# Patient Record
Sex: MALE | Race: WHITE | NOT HISPANIC OR LATINO | Employment: OTHER | URBAN - METROPOLITAN AREA
[De-identification: names, ages, dates, MRNs, and addresses within clinical notes are randomized per-mention and may not be internally consistent; named-entity substitution may affect disease eponyms.]

---

## 2022-03-29 ENCOUNTER — LAB (OUTPATIENT)
Dept: LAB | Facility: HOSPITAL | Age: 52
End: 2022-03-29

## 2022-03-29 ENCOUNTER — PATIENT ROUNDING (BHMG ONLY) (OUTPATIENT)
Dept: ENDOCRINOLOGY | Facility: CLINIC | Age: 52
End: 2022-03-29

## 2022-03-29 ENCOUNTER — OFFICE VISIT (OUTPATIENT)
Dept: ENDOCRINOLOGY | Facility: CLINIC | Age: 52
End: 2022-03-29

## 2022-03-29 VITALS
HEART RATE: 68 BPM | OXYGEN SATURATION: 97 % | HEIGHT: 76 IN | WEIGHT: 287 LBS | DIASTOLIC BLOOD PRESSURE: 82 MMHG | BODY MASS INDEX: 34.95 KG/M2 | SYSTOLIC BLOOD PRESSURE: 128 MMHG

## 2022-03-29 DIAGNOSIS — R79.89 HIGH SERUM PARATHYROID HORMONE (PTH): Primary | ICD-10-CM

## 2022-03-29 DIAGNOSIS — R79.89 HIGH SERUM PARATHYROID HORMONE (PTH): ICD-10-CM

## 2022-03-29 LAB
25(OH)D3 SERPL-MCNC: 29 NG/ML (ref 30–100)
ALBUMIN SERPL-MCNC: 4.9 G/DL (ref 3.5–5.2)
ALP SERPL-CCNC: 118 U/L (ref 39–117)
ANION GAP SERPL CALCULATED.3IONS-SCNC: 8 MMOL/L (ref 5–15)
BUN SERPL-MCNC: 12 MG/DL (ref 6–20)
BUN/CREAT SERPL: 10.4 (ref 7–25)
CALCIUM SPEC-SCNC: 9.5 MG/DL (ref 8.6–10.5)
CHLORIDE SERPL-SCNC: 103 MMOL/L (ref 98–107)
CO2 SERPL-SCNC: 27 MMOL/L (ref 22–29)
CREAT SERPL-MCNC: 1.15 MG/DL (ref 0.76–1.27)
EGFRCR SERPLBLD CKD-EPI 2021: 76.6 ML/MIN/1.73
GLUCOSE SERPL-MCNC: 97 MG/DL (ref 65–99)
PHOSPHATE SERPL-MCNC: 2.1 MG/DL (ref 2.5–4.5)
POTASSIUM SERPL-SCNC: 4.5 MMOL/L (ref 3.5–5.2)
PTH-INTACT SERPL-MCNC: 65.2 PG/ML (ref 15–65)
SODIUM SERPL-SCNC: 138 MMOL/L (ref 136–145)

## 2022-03-29 PROCEDURE — 84075 ASSAY ALKALINE PHOSPHATASE: CPT

## 2022-03-29 PROCEDURE — 36415 COLL VENOUS BLD VENIPUNCTURE: CPT

## 2022-03-29 PROCEDURE — 80069 RENAL FUNCTION PANEL: CPT

## 2022-03-29 PROCEDURE — 99203 OFFICE O/P NEW LOW 30 MIN: CPT | Performed by: INTERNAL MEDICINE

## 2022-03-29 PROCEDURE — 82652 VIT D 1 25-DIHYDROXY: CPT

## 2022-03-29 PROCEDURE — 83970 ASSAY OF PARATHORMONE: CPT

## 2022-03-29 PROCEDURE — 82306 VITAMIN D 25 HYDROXY: CPT

## 2022-03-29 RX ORDER — LORATADINE 10 MG/1
10 TABLET ORAL DAILY
COMMUNITY

## 2022-03-29 RX ORDER — FLUTICASONE PROPIONATE 50 MCG
2 SPRAY, SUSPENSION (ML) NASAL DAILY
COMMUNITY

## 2022-03-29 NOTE — ASSESSMENT & PLAN NOTE
He had elevated serum PTH but normal calcium and vit D levels.  No renal insufficiency.  I'm not sure of the etiology of the elevated PTH.  He could have mild primary hyperparathyroidism.  I would like to check further labs today.

## 2022-03-29 NOTE — PROGRESS NOTES
A [employer] message has been sent to the patient for patient rounding with [facility]

## 2022-03-29 NOTE — PROGRESS NOTES
"Subjective   Heriberto Jennings is a 51 y.o. male who presents to [facility] care for Abnormal Lab (Increased PTH Level)    Chief complaint: elevated PTH      History of Present Illness     He had labs done last year that showed elevated alk phos.  Fractionated levels of alk phos were normal.  He had CMP done 6/2021 that showed calcium of 9.5.  Cr was normal.  Alk phos was mildly elevated.  Ionized calcium was normal.  Vit D was normal at 57.3.  The PTH was 110.7 (ULN 63.5).  The PTH was repeated and was 73.8.  He had labs done again 10/2021.  The ionized calcium was normal again.  The 25 OH vit D was 35.4.  The PTH was 87.9.  He denies any h/o hypercalcemia.  He denies any kidney stones.  He had ankle and wrist fractures in 1980s.  He denies any recent bone fractures. He hasn't had DEXA done.  He denies any biotin use.  He denies any h/o thyroid disease.      The following portions of the patient's history were reviewed and updated as appropriate: allergies, current medications, past family history, past medical history, past social history, past surgical history and problem list.    Review of Systems   Constitutional: Negative.    HENT: Positive for congestion and rhinorrhea.    Eyes: Negative.    Respiratory: Negative.    Cardiovascular: Negative.    Gastrointestinal: Negative.    Endocrine: Negative.    Genitourinary: Negative.    Musculoskeletal: Positive for back pain and joint swelling.   Skin: Negative.    Allergic/Immunologic: Positive for environmental allergies.   Neurological: Negative.    Hematological: Negative.    Psychiatric/Behavioral: Negative.        Objective   Visit Vitals  /82 (BP Location: Left arm, Patient Position: Sitting, Cuff Size: Adult)   Pulse 68   Ht 193 cm (76\")   Wt 130 kg (287 lb)   SpO2 97%   BMI 34.93 kg/m²      Physical Exam  Constitutional:       Appearance: Normal appearance.   HENT:      Head: Normocephalic and atraumatic.   Eyes:      Extraocular Movements: Extraocular " movements intact.      Conjunctiva/sclera: Conjunctivae normal.      Pupils: Pupils are equal, round, and reactive to light.   Cardiovascular:      Rate and Rhythm: Normal rate and regular rhythm.      Pulses: Normal pulses.      Heart sounds: Normal heart sounds.   Pulmonary:      Effort: Pulmonary effort is normal.      Breath sounds: Normal breath sounds.   Abdominal:      General: Bowel sounds are normal.      Palpations: Abdomen is soft.   Musculoskeletal:         General: Normal range of motion.      Cervical back: Normal range of motion and neck supple.   Skin:     General: Skin is warm and dry.   Neurological:      General: No focal deficit present.      Mental Status: He is alert.   Psychiatric:         Mood and Affect: Mood normal.         Behavior: Behavior normal.         Thought Content: Thought content normal.         Judgment: Judgment normal.         Assessment/Plan     Diagnoses and all orders for this visit:    1. High serum parathyroid hormone (PTH) (Primary)  Assessment & Plan:  He had elevated serum PTH but normal calcium and vit D levels.  No renal insufficiency.  I'm not sure of the etiology of the elevated PTH.  He could have mild primary hyperparathyroidism.  I would like to check further labs today.      Orders:  -     Renal Function Panel; Future  -     Vitamin D 25 Hydroxy; Future  -     Vitamin D 1,25 Dihydroxy; Future  -     PTH, Intact; Future  -     Alkaline Phosphatase; Future               Return in about 6 months (around 9/29/2022) for Recheck with CMP, PTH, vit D.    Han Reeves MD   03/29/2022

## 2022-03-31 LAB — 1,25(OH)2D SERPL-MCNC: 48.6 PG/ML (ref 19.9–79.3)

## 2022-09-29 ENCOUNTER — LAB (OUTPATIENT)
Dept: LAB | Facility: HOSPITAL | Age: 52
End: 2022-09-29

## 2022-09-29 ENCOUNTER — OFFICE VISIT (OUTPATIENT)
Dept: ENDOCRINOLOGY | Facility: CLINIC | Age: 52
End: 2022-09-29

## 2022-09-29 VITALS
WEIGHT: 291 LBS | HEART RATE: 67 BPM | SYSTOLIC BLOOD PRESSURE: 126 MMHG | HEIGHT: 76 IN | BODY MASS INDEX: 35.44 KG/M2 | DIASTOLIC BLOOD PRESSURE: 80 MMHG | OXYGEN SATURATION: 96 %

## 2022-09-29 DIAGNOSIS — R74.8 ALKALINE PHOSPHATASE ELEVATION: ICD-10-CM

## 2022-09-29 DIAGNOSIS — S32.020D COMPRESSION FRACTURE OF L2 VERTEBRA WITH ROUTINE HEALING: ICD-10-CM

## 2022-09-29 DIAGNOSIS — R79.89 HIGH SERUM PARATHYROID HORMONE (PTH): Primary | ICD-10-CM

## 2022-09-29 DIAGNOSIS — E55.9 VITAMIN D DEFICIENCY: ICD-10-CM

## 2022-09-29 DIAGNOSIS — R79.89 HIGH SERUM PARATHYROID HORMONE (PTH): ICD-10-CM

## 2022-09-29 LAB
25(OH)D3 SERPL-MCNC: 38.1 NG/ML (ref 30–100)
ALBUMIN SERPL-MCNC: 4.4 G/DL (ref 3.5–5.2)
ANION GAP SERPL CALCULATED.3IONS-SCNC: 7.6 MMOL/L (ref 5–15)
BUN SERPL-MCNC: 11 MG/DL (ref 6–20)
BUN/CREAT SERPL: 10.9 (ref 7–25)
CALCIUM SPEC-SCNC: 8.9 MG/DL (ref 8.6–10.5)
CHLORIDE SERPL-SCNC: 102 MMOL/L (ref 98–107)
CO2 SERPL-SCNC: 29.4 MMOL/L (ref 22–29)
CREAT SERPL-MCNC: 1.01 MG/DL (ref 0.76–1.27)
EGFRCR SERPLBLD CKD-EPI 2021: 88.9 ML/MIN/1.73
GLUCOSE SERPL-MCNC: 99 MG/DL (ref 65–99)
PHOSPHATE SERPL-MCNC: 2.3 MG/DL (ref 2.5–4.5)
POTASSIUM SERPL-SCNC: 4.3 MMOL/L (ref 3.5–5.2)
PTH-INTACT SERPL-MCNC: 83.6 PG/ML (ref 15–65)
SODIUM SERPL-SCNC: 139 MMOL/L (ref 136–145)

## 2022-09-29 PROCEDURE — 83970 ASSAY OF PARATHORMONE: CPT

## 2022-09-29 PROCEDURE — 99214 OFFICE O/P EST MOD 30 MIN: CPT | Performed by: INTERNAL MEDICINE

## 2022-09-29 PROCEDURE — 80069 RENAL FUNCTION PANEL: CPT

## 2022-09-29 PROCEDURE — 82306 VITAMIN D 25 HYDROXY: CPT

## 2022-09-29 PROCEDURE — 36415 COLL VENOUS BLD VENIPUNCTURE: CPT

## 2022-09-29 NOTE — PROGRESS NOTES
"Subjective   Heriberto Jennings is a 52 y.o. male who presents for follow up of Abnormal Lab (PTH)    Chief complaint: high PTH      History of Present Illness     At the last visit the PTH had improved but was slightly high at 65.  The alk phos was slightly elevated.  The phos was mildly low.  Serum calcium was normal.  The vit D was mildly low.  We suggested vit D supplement of 2000 IU daily.  He denies any kidney stones.  He fell about 3 weeks ago.  He went to ER due to hematoma in buttocks.  They did X-ray that showed old L2 compression fracture.  He had labs done that showed calcium of 9.2.  The alk phos was 154 ().  Note was made of small lung nodule.  Further w/u is planned for this.      The following portions of the patient's history were reviewed and updated as appropriate: allergies, current medications, past family history, past medical history, past social history, past surgical history and problem list.    Review of Systems   Constitutional: Negative.    Cardiovascular: Negative.    Gastrointestinal: Negative.    Endocrine: Negative.        Objective   Visit Vitals  /80 (BP Location: Left arm, Patient Position: Sitting, Cuff Size: Adult)   Pulse 67   Ht 193 cm (76\")   Wt 132 kg (291 lb)   SpO2 96%   BMI 35.42 kg/m²      Physical Exam  Constitutional:       Appearance: Normal appearance.   Neurological:      Mental Status: He is alert.         Assessment & Plan     Diagnoses and all orders for this visit:    1. High serum parathyroid hormone (PTH) (Primary)  Assessment & Plan:  Check PTH and calcium today.      Orders:  -     Renal Function Panel; Future  -     PTH, Intact; Future    2. Vitamin D deficiency  Assessment & Plan:  Continue vit D supplement.  Check vit D level today.    Orders:  -     Vitamin D 25 Hydroxy; Future    3. Alkaline phosphatase elevation  Assessment & Plan:  Check alk phos level today.  Will send note about results.    Orders:  -     Cancel: Alkaline Phosphatase    4. " Compression fracture of L2 vertebra with routine healing  Assessment & Plan:  Plan for DEXA scan.      Orders:  -     DEXA Bone Density Axial; Future               Return in about 6 months (around 3/29/2023) for Recheck with renal function panel, PTH, alk phos and vit D..    Han Reeves MD   09/29/2022

## 2022-11-29 ENCOUNTER — HOSPITAL ENCOUNTER (OUTPATIENT)
Dept: BONE DENSITY | Facility: HOSPITAL | Age: 52
Discharge: HOME | End: 2022-11-29
Admitting: INTERNAL MEDICINE

## 2022-11-29 DIAGNOSIS — S32.020D COMPRESSION FRACTURE OF L2 VERTEBRA WITH ROUTINE HEALING: ICD-10-CM

## 2022-11-29 PROCEDURE — 77080 DXA BONE DENSITY AXIAL: CPT

## 2023-03-29 ENCOUNTER — OFFICE VISIT (OUTPATIENT)
Dept: ENDOCRINOLOGY | Facility: CLINIC | Age: 53
End: 2023-03-29
Payer: COMMERCIAL

## 2023-03-29 VITALS
BODY MASS INDEX: 35.92 KG/M2 | SYSTOLIC BLOOD PRESSURE: 122 MMHG | HEART RATE: 58 BPM | HEIGHT: 76 IN | OXYGEN SATURATION: 97 % | DIASTOLIC BLOOD PRESSURE: 80 MMHG | WEIGHT: 295 LBS

## 2023-03-29 DIAGNOSIS — E55.9 VITAMIN D DEFICIENCY: ICD-10-CM

## 2023-03-29 DIAGNOSIS — R79.89 HIGH SERUM PARATHYROID HORMONE (PTH): Primary | ICD-10-CM

## 2023-03-29 DIAGNOSIS — R74.8 ALKALINE PHOSPHATASE ELEVATION: ICD-10-CM

## 2023-03-29 LAB
25(OH)D3 SERPL-MCNC: 25.2 NG/ML (ref 30–100)
ALBUMIN SERPL-MCNC: 4.5 G/DL (ref 3.5–5.2)
ALBUMIN/GLOB SERPL: 1.8 G/DL
ALP SERPL-CCNC: 122 U/L (ref 39–117)
ALT SERPL W P-5'-P-CCNC: 20 U/L (ref 1–41)
ANION GAP SERPL CALCULATED.3IONS-SCNC: 8.3 MMOL/L (ref 5–15)
AST SERPL-CCNC: 17 U/L (ref 1–40)
BILIRUB SERPL-MCNC: 0.5 MG/DL (ref 0–1.2)
BUN SERPL-MCNC: 14 MG/DL (ref 6–20)
BUN/CREAT SERPL: 14.9 (ref 7–25)
CALCIUM SPEC-SCNC: 9.5 MG/DL (ref 8.6–10.5)
CHLORIDE SERPL-SCNC: 104 MMOL/L (ref 98–107)
CO2 SERPL-SCNC: 27.7 MMOL/L (ref 22–29)
CREAT SERPL-MCNC: 0.94 MG/DL (ref 0.76–1.27)
EGFRCR SERPLBLD CKD-EPI 2021: 96.9 ML/MIN/1.73
GLOBULIN UR ELPH-MCNC: 2.5 GM/DL
GLUCOSE SERPL-MCNC: 92 MG/DL (ref 65–99)
PHOSPHATE SERPL-MCNC: 2 MG/DL (ref 2.5–4.5)
POTASSIUM SERPL-SCNC: 4.4 MMOL/L (ref 3.5–5.2)
PROT SERPL-MCNC: 7 G/DL (ref 6–8.5)
PTH-INTACT SERPL-MCNC: 81.6 PG/ML (ref 15–65)
SODIUM SERPL-SCNC: 140 MMOL/L (ref 136–145)

## 2023-03-29 PROCEDURE — 80053 COMPREHEN METABOLIC PANEL: CPT | Performed by: INTERNAL MEDICINE

## 2023-03-29 PROCEDURE — 83970 ASSAY OF PARATHORMONE: CPT | Performed by: INTERNAL MEDICINE

## 2023-03-29 PROCEDURE — 82306 VITAMIN D 25 HYDROXY: CPT | Performed by: INTERNAL MEDICINE

## 2023-03-29 PROCEDURE — 99214 OFFICE O/P EST MOD 30 MIN: CPT | Performed by: INTERNAL MEDICINE

## 2023-03-29 PROCEDURE — 84100 ASSAY OF PHOSPHORUS: CPT | Performed by: INTERNAL MEDICINE

## 2023-03-29 NOTE — PROGRESS NOTES
"Subjective   Heriberto Jennings is a 52 y.o. male who presents for follow up of Abnormal Lab (High Serum Parathryoid)    Chief complaint: high PTH      History of Present Illness     At the last visit the PTH was high at 85.  The phos was mildly low.  Serum calcium was normal.  The vit D was normal.  He was on vit D supplement of 2000 IU daily.  He stopped this about 5 months ago.  He was getting more sun from working outside and thought he didn't need the vit D supplement.  He denies any kidney stones.  He denies any recent fractures.  At the last visit we ordered DEXA which showed normal BMD.    The following portions of the patient's history were reviewed and updated as appropriate: allergies, current medications, past family history, past medical history, past social history, past surgical history and problem list.    Review of Systems   Constitutional: Negative.    Cardiovascular: Negative.    Gastrointestinal: Negative.    Endocrine: Negative.        Objective   Visit Vitals  /80 (BP Location: Left arm, Patient Position: Sitting, Cuff Size: Adult)   Pulse 58   Ht 193 cm (76\")   Wt 134 kg (295 lb)   SpO2 97%   BMI 35.91 kg/m²      Physical Exam  Constitutional:       Appearance: Normal appearance.   Neurological:      Mental Status: He is alert.         Assessment & Plan     Diagnoses and all orders for this visit:    1. High serum parathyroid hormone (PTH) (Primary)  Assessment & Plan:  Check PTH today.  Calcium has been normal.  BMD on recent DEXA was okay.      Orders:  -     Phosphorus; Future  -     PTH, Intact; Future    2. Vitamin D deficiency  Assessment & Plan:  Check vit D level today.    Orders:  -     Vitamin D,25-Hydroxy; Future    3. Alkaline phosphatase elevation  Assessment & Plan:  Check CMP today.    Orders:  -     Comprehensive Metabolic Panel; Future             Current Outpatient Medications   Medication Instructions   • fluticasone (FLONASE) 50 MCG/ACT nasal spray 2 sprays, Nasal, Daily, " PRN ONLY   • loratadine (CLARITIN) 10 mg, Oral, Daily      Return in about 6 months (around 9/29/2023) for Recheck with CMP, PTH , vit D.    Han Reeves MD   03/29/2023

## 2023-10-02 ENCOUNTER — OFFICE VISIT (OUTPATIENT)
Dept: ENDOCRINOLOGY | Facility: CLINIC | Age: 53
End: 2023-10-02
Payer: COMMERCIAL

## 2023-10-02 VITALS
BODY MASS INDEX: 35.92 KG/M2 | OXYGEN SATURATION: 98 % | WEIGHT: 295 LBS | DIASTOLIC BLOOD PRESSURE: 84 MMHG | HEIGHT: 76 IN | HEART RATE: 70 BPM | SYSTOLIC BLOOD PRESSURE: 128 MMHG

## 2023-10-02 DIAGNOSIS — E55.9 VITAMIN D DEFICIENCY: ICD-10-CM

## 2023-10-02 DIAGNOSIS — R79.89 HIGH SERUM PARATHYROID HORMONE (PTH): Primary | ICD-10-CM

## 2023-10-02 LAB
25(OH)D3 SERPL-MCNC: 34.1 NG/ML (ref 30–100)
ALBUMIN SERPL-MCNC: 4.5 G/DL (ref 3.5–5.2)
ALBUMIN/GLOB SERPL: 1.6 G/DL
ALP SERPL-CCNC: 120 U/L (ref 39–117)
ALT SERPL W P-5'-P-CCNC: 26 U/L (ref 1–41)
ANION GAP SERPL CALCULATED.3IONS-SCNC: 10 MMOL/L (ref 5–15)
AST SERPL-CCNC: 20 U/L (ref 1–40)
BILIRUB SERPL-MCNC: 0.6 MG/DL (ref 0–1.2)
BUN SERPL-MCNC: 13 MG/DL (ref 6–20)
BUN/CREAT SERPL: 12.7 (ref 7–25)
CALCIUM SPEC-SCNC: 9.9 MG/DL (ref 8.6–10.5)
CHLORIDE SERPL-SCNC: 106 MMOL/L (ref 98–107)
CO2 SERPL-SCNC: 26 MMOL/L (ref 22–29)
CREAT SERPL-MCNC: 1.02 MG/DL (ref 0.76–1.27)
EGFRCR SERPLBLD CKD-EPI 2021: 87.3 ML/MIN/1.73
GLOBULIN UR ELPH-MCNC: 2.9 GM/DL
GLUCOSE SERPL-MCNC: 95 MG/DL (ref 65–99)
POTASSIUM SERPL-SCNC: 4.6 MMOL/L (ref 3.5–5.2)
PROT SERPL-MCNC: 7.4 G/DL (ref 6–8.5)
PTH-INTACT SERPL-MCNC: 71.5 PG/ML (ref 15–65)
SODIUM SERPL-SCNC: 142 MMOL/L (ref 136–145)

## 2023-10-02 PROCEDURE — 83970 ASSAY OF PARATHORMONE: CPT | Performed by: INTERNAL MEDICINE

## 2023-10-02 PROCEDURE — 80053 COMPREHEN METABOLIC PANEL: CPT | Performed by: INTERNAL MEDICINE

## 2023-10-02 PROCEDURE — 82306 VITAMIN D 25 HYDROXY: CPT | Performed by: INTERNAL MEDICINE

## 2023-10-02 PROCEDURE — 99213 OFFICE O/P EST LOW 20 MIN: CPT | Performed by: INTERNAL MEDICINE

## 2023-10-02 NOTE — PROGRESS NOTES
"     Office Note      Date: 10/02/2023  Patient Name: Heriberto Jennings  MRN: [id number]  : 1970    Chief Complaint   Patient presents with    Abnormal Lab     PTH       History of Present Illness:   Heriberto Jennings is a 53 y.o. male who presents for Abnormal Lab (PTH)    He has h/o elevated PTH with normal calcium.  At the last visit the PTH was stable at 81.  The 25 OH vit D was mildly low at 25.  I recommended starting vit D supplement of 2000 IU daily.  He took it up until summer started and he stopped it since he is outside all the time.  However, he is using sunscreen.  The DEXA showed normal BMD in 2022.  He denies any kidney stones.  He denies any recent bone fractures.  He denies any constipation.  He denies any muscle weakness.    Subjective      Review of Systems:   Review of Systems   Constitutional: Negative.    Cardiovascular: Negative.    Gastrointestinal: Negative.    Endocrine: Negative.      The following portions of the patient's history were reviewed and updated as appropriate: allergies, current medications, past family history, past medical history, past social history, past surgical history, and problem list.    Objective     Visit Vitals  /84 (BP Location: Left arm, Patient Position: Sitting, Cuff Size: Adult)   Pulse 70   Ht 193 cm (76\")   Wt 134 kg (295 lb)   SpO2 98%   BMI 35.91 kg/m²       Physical Exam:  Physical Exam  Constitutional:       Appearance: Normal appearance.   Neurological:      Mental Status: He is alert.       Labs:    TSH  No results found for: TSHBASE     Free T4  No results found for: FREET4    T3  No results found for: R1ACKYF      TPO  No results found for: THYROIDAB    TG AB  No results found for: THGAB    TG  No results found for: THYROGLB    CBC w/DIFF  Lab Results   Component Value Date    WBC 4.61 2014    RBC 5.00 2014    HGB 15.5 2014    HCT 44.6 2014    MCV 89.2 2014    MCH 31.0 2014    MCHC 34.8 2014    "  06/24/2014           Assessment / Plan      Assessment & Plan:  Diagnoses and all orders for this visit:    1. High serum parathyroid hormone (PTH) (Primary)  Assessment & Plan:  Check PTH today.  Unclear etiology of the elevation but calcium has remained normal thus far.    Orders:  -     Comprehensive Metabolic Panel; Future  -     PTH, Intact; Future    2. Vitamin D deficiency  Assessment & Plan:  Check vit D level today.  Will send note about results.    Orders:  -     Vitamin D,25-Hydroxy; Future      Current Outpatient Medications   Medication Instructions    fluticasone (FLONASE) 50 MCG/ACT nasal spray 2 sprays, Nasal, Daily, PRN ONLY    loratadine (CLARITIN) 10 mg, Oral, Daily      Return in about 6 months (around 4/2/2024) for Recheck with CMP, PTH, vit D.    Han Reeves MD   10/02/2023

## 2024-04-08 ENCOUNTER — OFFICE VISIT (OUTPATIENT)
Dept: ENDOCRINOLOGY | Facility: CLINIC | Age: 54
End: 2024-04-08
Payer: COMMERCIAL

## 2024-04-08 VITALS
BODY MASS INDEX: 36.04 KG/M2 | HEIGHT: 76 IN | SYSTOLIC BLOOD PRESSURE: 122 MMHG | DIASTOLIC BLOOD PRESSURE: 70 MMHG | HEART RATE: 69 BPM | OXYGEN SATURATION: 97 % | WEIGHT: 296 LBS

## 2024-04-08 DIAGNOSIS — R79.89 HIGH SERUM PARATHYROID HORMONE (PTH): ICD-10-CM

## 2024-04-08 DIAGNOSIS — R74.8 ALKALINE PHOSPHATASE ELEVATION: ICD-10-CM

## 2024-04-08 DIAGNOSIS — E55.9 VITAMIN D DEFICIENCY: Primary | ICD-10-CM

## 2024-04-08 LAB
25(OH)D3 SERPL-MCNC: 27.2 NG/ML (ref 30–100)
ALBUMIN SERPL-MCNC: 4.4 G/DL (ref 3.5–5.2)
ALBUMIN/GLOB SERPL: 2 G/DL
ALP SERPL-CCNC: 121 U/L (ref 39–117)
ALT SERPL W P-5'-P-CCNC: 29 U/L (ref 1–41)
ANION GAP SERPL CALCULATED.3IONS-SCNC: 9 MMOL/L (ref 5–15)
AST SERPL-CCNC: 20 U/L (ref 1–40)
BILIRUB SERPL-MCNC: 0.6 MG/DL (ref 0–1.2)
BUN SERPL-MCNC: 12 MG/DL (ref 6–20)
BUN/CREAT SERPL: 11.3 (ref 7–25)
CALCIUM SPEC-SCNC: 8.8 MG/DL (ref 8.6–10.5)
CHLORIDE SERPL-SCNC: 105 MMOL/L (ref 98–107)
CO2 SERPL-SCNC: 27 MMOL/L (ref 22–29)
CREAT SERPL-MCNC: 1.06 MG/DL (ref 0.76–1.27)
EGFRCR SERPLBLD CKD-EPI 2021: 83.4 ML/MIN/1.73
GLOBULIN UR ELPH-MCNC: 2.2 GM/DL
GLUCOSE SERPL-MCNC: 98 MG/DL (ref 65–99)
POTASSIUM SERPL-SCNC: 4.3 MMOL/L (ref 3.5–5.2)
PROT SERPL-MCNC: 6.6 G/DL (ref 6–8.5)
PTH-INTACT SERPL-MCNC: 84.2 PG/ML (ref 15–65)
SODIUM SERPL-SCNC: 141 MMOL/L (ref 136–145)

## 2024-04-08 PROCEDURE — 83970 ASSAY OF PARATHORMONE: CPT | Performed by: PHYSICIAN ASSISTANT

## 2024-04-08 PROCEDURE — 82306 VITAMIN D 25 HYDROXY: CPT | Performed by: PHYSICIAN ASSISTANT

## 2024-04-08 PROCEDURE — 80053 COMPREHEN METABOLIC PANEL: CPT | Performed by: PHYSICIAN ASSISTANT

## 2024-04-08 PROCEDURE — 99213 OFFICE O/P EST LOW 20 MIN: CPT | Performed by: PHYSICIAN ASSISTANT

## 2024-04-08 RX ORDER — ALBUTEROL SULFATE 90 UG/1
2 AEROSOL, METERED RESPIRATORY (INHALATION) EVERY 6 HOURS PRN
COMMUNITY
Start: 2023-12-13

## 2024-04-08 NOTE — ASSESSMENT & PLAN NOTE
Mild improvement to PTH with improved vitamin D levels in past - consider secondary to vitamin D deficiency.  Calcium level has remained normal.   Repeat labs today.  Encouraged continued strength exercises.  Advised repeat DEXA 11/2024 for monitoring.

## 2024-04-08 NOTE — ASSESSMENT & PLAN NOTE
Discussed likely decreased values given winter months and not taking supplement.   Encouraged taking vitamin D supplement at least 3 days per week.  Recheck labs today.

## 2024-04-08 NOTE — ASSESSMENT & PLAN NOTE
Mild elevation without hx other elevated LFT.   Possible vitamin d deficiency.  Ddx: bone disease, liver/gallbladder disease, malignancy, among others  Consider further evaluation if increases over time.   Encouraged preventative care screenings through PCP.    Repeat CMP today.

## 2024-04-08 NOTE — PROGRESS NOTES
Chief Complaint   Patient presents with    Increased PTH level        HPI  Heriberto Jennings is a 53 y.o. male presents today for follow-up evaluation of elevated PTH . He was last seen 10/2/2023 by Dr. Han Reeves.   Without additional concerns.     He has h/o elevated PTH with normal calcium, mild elevated ALP levels.  At the last visit the PTH was mildly improved at 71. The 25 OH vit D was improving at 34. He has not been taking vitamin D supplement - reports outside in summer with adequate sun exposure although does wear sunscreen. Drinks milk 1 glass on weekends. Weight lifting twice/week.  Has been recommended starting vit D supplement of 2000 IU daily in past, but not good with taking medications.      He has a hx of L2 compression fracture (2022) - hx hit by cow resulting pain in the area that occurred years prior believes may have been contributor. Has hx of laminectomies. He denies any recent bone fractures, he has hx traumatic wrist and ankle fractures in young adulthood. The DEXA showed normal BMD in 11/2022. He denies any personal hx kidney stones - father and sister with hx kidney stones. Denies family hx of osteoporosis or bone disorders. He denies changes in fatigue, mental status, constipation, muscle weakness.       History reviewed. No pertinent past medical history.  Past Surgical History:   Procedure Laterality Date    KNEE SURGERY Left     ACL repair    SPINE SURGERY N/A     L4, L5 Laminectomy    SPINE SURGERY N/A     L5, S1 Laminectomy    WISDOM TOOTH EXTRACTION N/A       Family History   Problem Relation Age of Onset    Diabetes Mother         Type 2 diabetes    Glaucoma Mother     Nephrolithiasis Father     Cancer Sister         Skin    Nephrolithiasis Sister     Hypertension Paternal Grandfather         Managed with Medication      Social History     Socioeconomic History    Marital status:    Tobacco Use    Smoking status: Never    Smokeless tobacco: Never   Vaping Use    Vaping  "status: Never Used   Substance and Sexual Activity    Alcohol use: Yes     Alcohol/week: 2.0 standard drinks of alcohol     Types: 1 Glasses of wine, 1 Cans of beer per week     Comment: 6 oz glass of wine on Thursday and 12 oz beer on Saturday    Drug use: Never    Sexual activity: Yes     Partners: Female      No Known Allergies   Current Outpatient Medications on File Prior to Visit   Medication Sig Dispense Refill    albuterol sulfate  (90 Base) MCG/ACT inhaler Inhale 2 puffs Every 6 (Six) Hours As Needed for Wheezing or Shortness of Air.      fluticasone (FLONASE) 50 MCG/ACT nasal spray 2 sprays into the nostril(s) as directed by provider Daily. PRN ONLY      loratadine (CLARITIN) 10 MG tablet Take 1 tablet by mouth Daily.       No current facility-administered medications on file prior to visit.        Review of Systems   Constitutional:  Negative for fatigue.   Gastrointestinal:  Negative for abdominal pain and constipation.   Genitourinary:  Negative for flank pain.   Musculoskeletal:  Positive for back pain. Negative for arthralgias and myalgias.   Psychiatric/Behavioral:  Negative for behavioral problems and decreased concentration.         /70   Pulse 69   Ht 193 cm (76\")   Wt 134 kg (296 lb)   SpO2 97%   BMI 36.03 kg/m²      Physical Exam  Constitutional:       Appearance: Normal appearance. He is obese.   Neck:      Thyroid: No thyroid mass, thyromegaly or thyroid tenderness.   Cardiovascular:      Rate and Rhythm: Normal rate.   Pulmonary:      Effort: Pulmonary effort is normal.   Musculoskeletal:         General: Normal range of motion.   Skin:     General: Skin is warm and dry.   Neurological:      General: No focal deficit present.      Mental Status: He is alert and oriented to person, place, and time.   Psychiatric:         Mood and Affect: Mood normal.         Behavior: Behavior normal.         LABS AND IMAGING  CMP:  Lab Results   Component Value Date    BUN 13 10/02/2023    " CREATININE 1.02 10/02/2023    EGFR 87.3 10/02/2023    BCR 12.7 10/02/2023     10/02/2023    K 4.6 10/02/2023    CO2 26.0 10/02/2023    CALCIUM 9.9 10/02/2023    ALBUMIN 4.5 10/02/2023    BILITOT 0.6 10/02/2023    ALKPHOS 120 (H) 10/02/2023    AST 20 10/02/2023    ALT 26 10/02/2023     Vitamin D:  Lab Results   Component Value Date    ENZL35FG 34.1 10/02/2023    BINH76EO 25.2 (L) 03/29/2023    IXCY14BE 38.1 09/29/2022    ADXA42SU 29.0 (L) 03/29/2022    MTMB689 48.6 03/29/2022        Assessment and Plan    Diagnoses and all orders for this visit:    1. Vitamin D deficiency (Primary)  Assessment & Plan:  Discussed likely decreased values given winter months and not taking supplement.   Encouraged taking vitamin D supplement at least 3 days per week.  Recheck labs today.     Orders:  -     Vitamin D 25 Hydroxy    2. High serum parathyroid hormone (PTH)  Assessment & Plan:  Mild improvement to PTH with improved vitamin D levels in past - consider secondary to vitamin D deficiency.  Calcium level has remained normal.   Repeat labs today.  Encouraged continued strength exercises.  Advised repeat DEXA 11/2024 for monitoring.     Orders:  -     PTH, Intact  -     Comprehensive Metabolic Panel    3. Alkaline phosphatase elevation  Assessment & Plan:  Mild elevation without hx other elevated LFT.   Possible vitamin d deficiency.  Ddx: bone disease, liver/gallbladder disease, malignancy, among others  Consider further evaluation if increases over time.   Encouraged preventative care screenings through PCP.    Repeat CMP today.     Orders:  -     Comprehensive Metabolic Panel       Return in about 6 months (around 10/8/2024). The patient was instructed to contact the clinic with any interval questions or concerns.    Shantell Interiano PA-C   Endocrinology    Please note that portions of this note were completed with a voice recognition program.

## 2024-10-29 ENCOUNTER — OFFICE VISIT (OUTPATIENT)
Dept: ENDOCRINOLOGY | Facility: CLINIC | Age: 54
End: 2024-10-29
Payer: COMMERCIAL

## 2024-10-29 VITALS
SYSTOLIC BLOOD PRESSURE: 120 MMHG | HEART RATE: 61 BPM | BODY MASS INDEX: 34.51 KG/M2 | HEIGHT: 76 IN | DIASTOLIC BLOOD PRESSURE: 80 MMHG | WEIGHT: 283.4 LBS | OXYGEN SATURATION: 95 %

## 2024-10-29 DIAGNOSIS — R79.89 HIGH SERUM PARATHYROID HORMONE (PTH): Primary | ICD-10-CM

## 2024-10-29 DIAGNOSIS — R74.8 ALKALINE PHOSPHATASE ELEVATION: ICD-10-CM

## 2024-10-29 DIAGNOSIS — E55.9 VITAMIN D DEFICIENCY: ICD-10-CM

## 2024-10-29 LAB
25(OH)D3 SERPL-MCNC: 30.7 NG/ML (ref 30–100)
ALBUMIN SERPL-MCNC: 4.5 G/DL (ref 3.5–5.2)
ALBUMIN/GLOB SERPL: 1.7 G/DL
ALP SERPL-CCNC: 128 U/L (ref 39–117)
ALT SERPL W P-5'-P-CCNC: 32 U/L (ref 1–41)
ANION GAP SERPL CALCULATED.3IONS-SCNC: 6.9 MMOL/L (ref 5–15)
AST SERPL-CCNC: 24 U/L (ref 1–40)
BILIRUB SERPL-MCNC: 0.4 MG/DL (ref 0–1.2)
BUN SERPL-MCNC: 16 MG/DL (ref 6–20)
BUN/CREAT SERPL: 16.5 (ref 7–25)
CALCIUM SPEC-SCNC: 9.5 MG/DL (ref 8.6–10.5)
CHLORIDE SERPL-SCNC: 106 MMOL/L (ref 98–107)
CO2 SERPL-SCNC: 28.1 MMOL/L (ref 22–29)
CREAT SERPL-MCNC: 0.97 MG/DL (ref 0.76–1.27)
EGFRCR SERPLBLD CKD-EPI 2021: 92.2 ML/MIN/1.73
GLOBULIN UR ELPH-MCNC: 2.7 GM/DL
GLUCOSE SERPL-MCNC: 94 MG/DL (ref 65–99)
POTASSIUM SERPL-SCNC: 4.7 MMOL/L (ref 3.5–5.2)
PROT SERPL-MCNC: 7.2 G/DL (ref 6–8.5)
PTH-INTACT SERPL-MCNC: 70.9 PG/ML (ref 15–65)
SODIUM SERPL-SCNC: 141 MMOL/L (ref 136–145)

## 2024-10-29 PROCEDURE — 80053 COMPREHEN METABOLIC PANEL: CPT | Performed by: INTERNAL MEDICINE

## 2024-10-29 PROCEDURE — 82306 VITAMIN D 25 HYDROXY: CPT | Performed by: INTERNAL MEDICINE

## 2024-10-29 PROCEDURE — 83970 ASSAY OF PARATHORMONE: CPT | Performed by: INTERNAL MEDICINE

## 2024-10-29 NOTE — PROGRESS NOTES
"     Office Note      Date: 10/29/2024  Patient Name: Heriberto Jennings  MRN: [id number]  : 1970    Chief Complaint   Patient presents with    Hyperparathyroidism    Vitamin D Deficiency       History of Present Illness:   Heriberto Jennings is a 54 y.o. male who presents for Hyperparathyroidism and Vitamin D Deficiency    He has h/o elevated PTH likely due to vit D deficiency.  He has elevated alk phos.  He hasn't been in the sun as much this summer.  He is taking vit D 5000 IU about 3 days a week now.  He denies any bone fractures since last visit.  DEXA in  showed normal BMD.  He notes some fatigue.  He notes some insomnia.    Subjective      Review of Systems:   Review of Systems   Constitutional: Negative.    Cardiovascular: Negative.    Gastrointestinal: Negative.    Endocrine: Negative.        The following portions of the patient's history were reviewed and updated as appropriate: allergies, current medications, past family history, past medical history, past social history, past surgical history, and problem list.    Objective     Visit Vitals  /80 (BP Location: Left arm, Patient Position: Sitting, Cuff Size: Adult)   Pulse 61   Ht 193 cm (75.98\")   Wt 129 kg (283 lb 6.4 oz)   SpO2 95%   BMI 34.51 kg/m²       Physical Exam:  Physical Exam  Constitutional:       Appearance: Normal appearance.   Neurological:      Mental Status: He is alert.         Labs:    TSH  No results found for: \"TSHBASE\"     Free T4  No results found for: \"FREET4\"    T3  No results found for: \"B5AYQLT\"      TPO  No results found for: \"THYROIDAB\"    TG AB  No results found for: \"THGAB\"    TG  No results found for: \"THYROGLB\"    CBC w/DIFF  Lab Results   Component Value Date    WBC 4.61 2014    RBC 5.00 2014    HGB 15.5 2014    HCT 44.6 2014    MCV 89.2 2014    MCH 31.0 2014    MCHC 34.8 2014     2014           Assessment / Plan      Assessment & Plan:  Diagnoses and all " orders for this visit:    1. High serum parathyroid hormone (PTH) (Primary)  Assessment & Plan:  Likely secondary to vit D deficiency.  Check PTH today.    Orders:  -     Comprehensive Metabolic Panel; Future  -     PTH, Intact; Future    2. Vitamin D deficiency  Assessment & Plan:  Continue vit D supplement.  Check vit D level today.    Orders:  -     Vitamin D,25-Hydroxy; Future    3. Alkaline phosphatase elevation  Assessment & Plan:  Likely due to elevated PTH due to vit D deficiency.  Treat with vit D.  Check alk phos again today.        Current Outpatient Medications   Medication Instructions    albuterol sulfate  (90 Base) MCG/ACT inhaler 2 puffs, Every 6 Hours PRN    fluticasone (FLONASE) 50 MCG/ACT nasal spray 2 sprays, Daily    loratadine (CLARITIN) 10 mg, Daily    vitamin D3 5,000 Units, 2 Times Weekly      Return in about 6 months (around 4/29/2025) for Recheck with CMP, PTH, vit D.    Electronically signed by: Han Reeves MD  10/29/2024

## 2024-10-29 NOTE — ASSESSMENT & PLAN NOTE
Likely due to elevated PTH due to vit D deficiency.  Treat with vit D.  Check alk phos again today.

## 2025-05-15 ENCOUNTER — RESULTS FOLLOW-UP (OUTPATIENT)
Dept: ENDOCRINOLOGY | Facility: CLINIC | Age: 55
End: 2025-05-15

## 2025-05-15 ENCOUNTER — OFFICE VISIT (OUTPATIENT)
Dept: ENDOCRINOLOGY | Facility: CLINIC | Age: 55
End: 2025-05-15
Payer: COMMERCIAL

## 2025-05-15 VITALS
BODY MASS INDEX: 35.31 KG/M2 | HEIGHT: 76 IN | SYSTOLIC BLOOD PRESSURE: 120 MMHG | DIASTOLIC BLOOD PRESSURE: 78 MMHG | HEART RATE: 65 BPM | OXYGEN SATURATION: 100 % | WEIGHT: 290 LBS

## 2025-05-15 DIAGNOSIS — R74.8 ALKALINE PHOSPHATASE ELEVATION: ICD-10-CM

## 2025-05-15 DIAGNOSIS — R79.89 HIGH SERUM PARATHYROID HORMONE (PTH): Primary | ICD-10-CM

## 2025-05-15 DIAGNOSIS — E55.9 VITAMIN D DEFICIENCY: ICD-10-CM

## 2025-05-15 LAB
25(OH)D3 SERPL-MCNC: 31.4 NG/ML (ref 30–100)
ALBUMIN SERPL-MCNC: 4.4 G/DL (ref 3.5–5.2)
ALBUMIN/GLOB SERPL: 1.6 G/DL
ALP SERPL-CCNC: 132 U/L (ref 39–117)
ALT SERPL W P-5'-P-CCNC: 17 U/L (ref 1–41)
ANION GAP SERPL CALCULATED.3IONS-SCNC: 8.1 MMOL/L (ref 5–15)
AST SERPL-CCNC: 16 U/L (ref 1–40)
BILIRUB SERPL-MCNC: 0.5 MG/DL (ref 0–1.2)
BUN SERPL-MCNC: 17 MG/DL (ref 6–20)
BUN/CREAT SERPL: 16 (ref 7–25)
CALCIUM SPEC-SCNC: 9.6 MG/DL (ref 8.6–10.5)
CHLORIDE SERPL-SCNC: 104 MMOL/L (ref 98–107)
CO2 SERPL-SCNC: 25.9 MMOL/L (ref 22–29)
CREAT SERPL-MCNC: 1.06 MG/DL (ref 0.76–1.27)
EGFRCR SERPLBLD CKD-EPI 2021: 82.9 ML/MIN/1.73
GLOBULIN UR ELPH-MCNC: 2.7 GM/DL
GLUCOSE SERPL-MCNC: 89 MG/DL (ref 65–99)
POTASSIUM SERPL-SCNC: 4.3 MMOL/L (ref 3.5–5.2)
PROT SERPL-MCNC: 7.1 G/DL (ref 6–8.5)
PTH-INTACT SERPL-MCNC: 70.9 PG/ML (ref 15–65)
SODIUM SERPL-SCNC: 138 MMOL/L (ref 136–145)

## 2025-05-15 PROCEDURE — 82306 VITAMIN D 25 HYDROXY: CPT | Performed by: INTERNAL MEDICINE

## 2025-05-15 PROCEDURE — 83970 ASSAY OF PARATHORMONE: CPT | Performed by: INTERNAL MEDICINE

## 2025-05-15 PROCEDURE — 80053 COMPREHEN METABOLIC PANEL: CPT | Performed by: INTERNAL MEDICINE

## 2025-05-15 NOTE — PROGRESS NOTES
"     Office Note      Date: 05/15/2025  Patient Name: Heriberto Jennings  MRN: [id number]  : 1970    Chief Complaint   Patient presents with    Vitamin D Deficiency    Parathyroid       History of Present Illness:   Heriberto Jennings is a 54 y.o. male who presents for Vitamin D Deficiency and Parathyroid    He has h/o elevated PTH likely due to vit D deficiency. He has elevated alk phos. He is taking vit D 5000 IU about 1+ day a week now. He denies any bone fractures since last visit. DEXA in  showed normal BMD. He notes some fatigue. He notes some insomnia.     He has been exercising more recently.  He has been able to lose about about 8 or 9 pounds.    Subjective      Review of Systems:   Review of Systems   Constitutional:  Positive for fatigue.   Cardiovascular: Negative.    Gastrointestinal: Negative.    Endocrine: Negative.        The following portions of the patient's history were reviewed and updated as appropriate: allergies, current medications, past family history, past medical history, past social history, past surgical history, and problem list.    Objective     Visit Vitals  /78 (BP Location: Left arm, Patient Position: Sitting, Cuff Size: Adult)   Pulse 65   Ht 192.8 cm (75.9\")   Wt 132 kg (290 lb)   SpO2 100%   BMI 35.39 kg/m²       Physical Exam:  Physical Exam  Constitutional:       Appearance: Normal appearance.   Neurological:      Mental Status: He is alert.         Labs:    TSH  No results found for: \"TSHBASE\"     Free T4  No results found for: \"FREET4\"    T3  No results found for: \"G3ENAQZ\"      TPO  No results found for: \"THYROIDAB\"    TG AB  No results found for: \"THGAB\"    TG  No results found for: \"THYROGLB\"    CBC w/DIFF  Lab Results   Component Value Date    WBC 4.61 2014    RBC 5.00 2014    HGB 15.5 2014    HCT 44.6 2014    MCV 89.2 2014    MCH 31.0 2014    MCHC 34.8 2014     2014           Assessment / Plan  "     Assessment & Plan:  Diagnoses and all orders for this visit:    1. High serum parathyroid hormone (PTH) (Primary)  Assessment & Plan:  Check PTH today.    Orders:  -     PTH, Intact; Future    2. Vitamin D deficiency  Assessment & Plan:  Check vit D level today.  Continue vit D supplementation.    Orders:  -     Vitamin D,25-Hydroxy; Future    3. Alkaline phosphatase elevation  Assessment & Plan:  Check alk phos again today.    Orders:  -     Comprehensive Metabolic Panel; Future      Current Outpatient Medications   Medication Instructions    fluticasone (FLONASE) 50 MCG/ACT nasal spray 2 sprays, Daily    loratadine (CLARITIN) 10 mg, Daily    vitamin D3 5,000 Units, 2 Times Weekly      Return in about 6 months (around 11/15/2025) for Recheck with CMP, PTH, vit D.    Electronically signed by: Han Reeves MD  05/15/2025